# Patient Record
Sex: MALE | URBAN - METROPOLITAN AREA
[De-identification: names, ages, dates, MRNs, and addresses within clinical notes are randomized per-mention and may not be internally consistent; named-entity substitution may affect disease eponyms.]

---

## 2022-08-05 ENCOUNTER — APPOINTMENT (RX ONLY)
Dept: URBAN - METROPOLITAN AREA CLINIC 77 | Facility: CLINIC | Age: 27
Setting detail: DERMATOLOGY
End: 2022-08-05

## 2022-08-05 DIAGNOSIS — L98.8 OTHER SPECIFIED DISORDERS OF THE SKIN AND SUBCUTANEOUS TISSUE: ICD-10-CM

## 2022-08-05 DIAGNOSIS — L81.4 OTHER MELANIN HYPERPIGMENTATION: ICD-10-CM

## 2022-08-05 DIAGNOSIS — L85.3 XEROSIS CUTIS: ICD-10-CM

## 2022-08-05 PROCEDURE — ? PRESCRIPTION

## 2022-08-05 PROCEDURE — ? TREATMENT REGIMEN

## 2022-08-05 PROCEDURE — ? COUNSELING

## 2022-08-05 PROCEDURE — 99203 OFFICE O/P NEW LOW 30 MIN: CPT

## 2022-08-05 PROCEDURE — ? BOTOX (U OR CC)

## 2022-08-05 RX ORDER — PHARMACY COMPOUNDING ACCESSORY
EACH MISCELLANEOUS
Qty: 50 | Refills: 3 | Status: ERX | COMMUNITY
Start: 2022-08-05

## 2022-08-05 RX ADMIN — Medication: at 00:00

## 2022-08-05 ASSESSMENT — LOCATION SIMPLE DESCRIPTION DERM
LOCATION SIMPLE: LEFT TEMPLE
LOCATION SIMPLE: GLABELLA
LOCATION SIMPLE: LEFT FOREHEAD
LOCATION SIMPLE: RIGHT FOREHEAD
LOCATION SIMPLE: RIGHT TEMPLE
LOCATION SIMPLE: LEFT ELBOW
LOCATION SIMPLE: RIGHT ELBOW
LOCATION SIMPLE: INFERIOR FOREHEAD

## 2022-08-05 ASSESSMENT — LOCATION DETAILED DESCRIPTION DERM
LOCATION DETAILED: RIGHT INFERIOR FOREHEAD
LOCATION DETAILED: LEFT INFERIOR FOREHEAD
LOCATION DETAILED: LEFT INFERIOR TEMPLE
LOCATION DETAILED: LEFT ELBOW
LOCATION DETAILED: RIGHT MID TEMPLE
LOCATION DETAILED: INFERIOR MID FOREHEAD
LOCATION DETAILED: RIGHT FOREHEAD
LOCATION DETAILED: GLABELLA
LOCATION DETAILED: LEFT MEDIAL FOREHEAD
LOCATION DETAILED: RIGHT ELBOW

## 2022-08-05 ASSESSMENT — LOCATION ZONE DERM
LOCATION ZONE: ARM
LOCATION ZONE: FACE

## 2022-08-05 NOTE — PROCEDURE: BOTOX (U OR CC)
[facility] Units: 0
Including Pricing Information In The Note: No
Consent: Written consent obtained. Risks include but not limited to lid/brow ptosis, bruising, swelling, diplopia, temporary effect, incomplete chemical denervation.
Document As Units Or Cc?: units
Dilution (U/0.1 Cc): 4
Post-Care Instructions: Patient instructed to not lie down for 4 hours and limit physical activity for 24 hours.
Price (Use Numbers Only, No Special Characters Or $): 955
Detail Level: Detailed
Quantity Per Injection Site (Units Or Cc): 4 U

## 2022-08-05 NOTE — PROCEDURE: TREATMENT REGIMEN
Plan: Location: forehead, glabella and bilateral temples and bunny lines\\nTreatment: 45 units of Botox charge 40units of Botox \\n\\nPt is here for Botox \\nPt mentions he would like to discuss about his underbags of his eyes \\nToday I will administer 45units of Botox and charge for 40 units of Botox at $14 a unit\\nDiscussed with the patient I can administer filler if he ever decides to proceed and I referred him to Dr Vasquez to discuss about blepharoplasty treatment \\nDiscussed with the patient we will follow up in 2 weeks on the nurse schedule \\nAlso I will prescribe HQ 4% Tretinoin 0.025% compound cream QHS to help with the pigmentation under his eyes\\n\\n\\nF/u 4-6months
Detail Level: Zone
Plan: Location: face\\nPrescribed: HQ 4% Tretinoin 0.025% compound cream QHS\\n\\nPt has hyperpigmentation on face today.\\nDiscussed with pt that this pigmentation is due to sun damage to the skin, advised patient to use SPF daily.\\nDiscussed with pt that we will start patient on HQ 4% Tretinoin 0.025% to use nightly.\\nDiscussed with pt to apply a pea size amount to face, pushing into pores. \\nDiscussed with pt to avoid areas around eyes, nasal crease, and around the lips since skin is thin and may get irritated easily.\\n\\nDiscussed with pt that it will come in a box that says keep refrigerated.\\nDiscussed with pt that they do not need to keep it refrigerated, but have to keep it in a dark place since sunlight may oxidize it.\\nDiscussed with pt that if skin becomes irritated while using cream, then pt can change frequency to every other night, or every other other night, etc.\\nDiscussed with pt that results are not immediate and may take up to a month to notice change.\\nAdvised pt to apply a moisturizing cream such as Cerave afterwards to help reestablish a healthy skin barrier.\\nF/u as needed.

## 2022-08-17 ENCOUNTER — APPOINTMENT (RX ONLY)
Dept: URBAN - METROPOLITAN AREA CLINIC 77 | Facility: CLINIC | Age: 27
Setting detail: DERMATOLOGY
End: 2022-08-17

## 2022-08-17 DIAGNOSIS — L98.8 OTHER SPECIFIED DISORDERS OF THE SKIN AND SUBCUTANEOUS TISSUE: ICD-10-CM

## 2022-08-17 PROCEDURE — ? TREATMENT REGIMEN

## 2022-08-17 PROCEDURE — ? BOTOX (U OR CC)

## 2022-08-17 PROCEDURE — ? COUNSELING

## 2022-08-17 ASSESSMENT — LOCATION SIMPLE DESCRIPTION DERM
LOCATION SIMPLE: INFERIOR FOREHEAD
LOCATION SIMPLE: LEFT FOREHEAD
LOCATION SIMPLE: RIGHT TEMPLE
LOCATION SIMPLE: GLABELLA
LOCATION SIMPLE: LEFT TEMPLE
LOCATION SIMPLE: RIGHT FOREHEAD

## 2022-08-17 ASSESSMENT — LOCATION DETAILED DESCRIPTION DERM
LOCATION DETAILED: RIGHT MID TEMPLE
LOCATION DETAILED: LEFT INFERIOR FOREHEAD
LOCATION DETAILED: LEFT INFERIOR TEMPLE
LOCATION DETAILED: INFERIOR MID FOREHEAD
LOCATION DETAILED: RIGHT FOREHEAD
LOCATION DETAILED: RIGHT INFERIOR FOREHEAD
LOCATION DETAILED: LEFT MEDIAL FOREHEAD
LOCATION DETAILED: GLABELLA

## 2022-08-17 ASSESSMENT — LOCATION ZONE DERM: LOCATION ZONE: FACE

## 2022-08-17 NOTE — PROCEDURE: BOTOX (U OR CC)
[facility] Units: 0
Including Pricing Information In The Note: No
Consent: Written consent obtained. Risks include but not limited to lid/brow ptosis, bruising, swelling, diplopia, temporary effect, incomplete chemical denervation.
Document As Units Or Cc?: units
Dilution (U/0.1 Cc): 4
Post-Care Instructions: Patient instructed to not lie down for 4 hours and limit physical activity for 24 hours.
Detail Level: Detailed
Quantity Per Injection Site (Units Or Cc): 4 U

## 2022-08-17 NOTE — PROCEDURE: MIPS QUALITY
Quality 226: Preventive Care And Screening: Tobacco Use: Screening And Cessation Intervention: Patient screened for tobacco use and is an ex/non-smoker
Detail Level: Detailed
Quality 130: Documentation Of Current Medications In The Medical Record: Current Medications Documented
Quality 110: Preventive Care And Screening: Influenza Immunization: Influenza Immunization not Administered for Documented Reasons.
Additional Notes: Patient didn’t receive injection

## 2022-08-17 NOTE — PROCEDURE: TREATMENT REGIMEN
Plan: Location: forehead, glabella and bilateral temples and bunny lines\\nTreatment: 45 units of Botox charge 40units of Botox \\n\\n08/17/2022\\n\\n\\nPatient is here for a follow up on Botox \\nThere is still some wrinkling at todays visit, will administer 4 units free of charge per Dr. Reyna.\\n\\nPatient is to follow up as needed \\n——————————-\\nPt is here for Botox \\nPt mentions he would like to discuss about his underbags of his eyes \\nToday I will administer 45units of Botox and charge for 40 units of Botox at $14 a unit\\nDiscussed with the patient I can administer filler if he ever decides to proceed and I referred him to Dr Vasquez to discuss about blepharoplasty treatment \\nDiscussed with the patient we will follow up in 2 weeks on the nurse schedule \\nAlso I will prescribe HQ 4% Tretinoin 0.025% compound cream QHS to help with the pigmentation under his eyes\\n\\n\\nF/u 4-6months
Detail Level: Zone

## 2023-01-06 ENCOUNTER — APPOINTMENT (RX ONLY)
Dept: URBAN - METROPOLITAN AREA CLINIC 77 | Facility: CLINIC | Age: 28
Setting detail: DERMATOLOGY
End: 2023-01-06

## 2023-01-06 DIAGNOSIS — L98.8 OTHER SPECIFIED DISORDERS OF THE SKIN AND SUBCUTANEOUS TISSUE: ICD-10-CM

## 2023-01-06 PROCEDURE — ? COUNSELING

## 2023-01-06 PROCEDURE — ? TREATMENT REGIMEN

## 2023-01-06 PROCEDURE — ? BOTOX (U OR CC)

## 2023-01-06 ASSESSMENT — LOCATION ZONE DERM: LOCATION ZONE: FACE

## 2023-01-06 ASSESSMENT — LOCATION DETAILED DESCRIPTION DERM
LOCATION DETAILED: GLABELLA
LOCATION DETAILED: LEFT INFERIOR TEMPLE
LOCATION DETAILED: RIGHT MID TEMPLE
LOCATION DETAILED: RIGHT INFERIOR FOREHEAD
LOCATION DETAILED: INFERIOR MID FOREHEAD
LOCATION DETAILED: LEFT INFERIOR FOREHEAD
LOCATION DETAILED: LEFT MEDIAL FOREHEAD
LOCATION DETAILED: RIGHT FOREHEAD

## 2023-01-06 ASSESSMENT — LOCATION SIMPLE DESCRIPTION DERM
LOCATION SIMPLE: RIGHT TEMPLE
LOCATION SIMPLE: RIGHT FOREHEAD
LOCATION SIMPLE: LEFT TEMPLE
LOCATION SIMPLE: LEFT FOREHEAD
LOCATION SIMPLE: GLABELLA
LOCATION SIMPLE: INFERIOR FOREHEAD

## 2023-01-06 NOTE — PROCEDURE: BOTOX (U OR CC)
[facility] Units: 0
Including Pricing Information In The Note: No
Consent: Written consent obtained. Risks include but not limited to lid/brow ptosis, bruising, swelling, diplopia, temporary effect, incomplete chemical denervation.
Document As Units Or Cc?: units
Dilution (U/0.1 Cc): 4
Post-Care Instructions: Patient instructed to not lie down for 4 hours and limit physical activity for 24 hours.
Price (Use Numbers Only, No Special Characters Or $): 745
Detail Level: Detailed
Quantity Per Injection Site (Units Or Cc): 4 U

## 2023-01-06 NOTE — PROCEDURE: TREATMENT REGIMEN
Plan: Location: forehead, glabella and bilateral temples and bunny lines\\nPrevious Treatment: 45 units of Botox charge 40units of Botox(8/5/2022)\\nPrevious treatment: 4 units of Botox(8/17/2022)\\nTodays treatment: 50 units of Botox charge 45units of Botox \\n\\n1/6/2022\\nPt is here for Botox \\nPt had 4units administer of Botox in 8/17/2022 and had recently came in 8/5/2022 and 45units of Botox was administer \\nToday I will administer 50units of Botox and charge for 45 units of Botox at $14 a unit\\nDiscussed with the patient I can administer filler if he ever decides to proceed and I referred him to Dr Vasquez to discuss about blepharoplasty treatment \\n\\n\\nF/u 4-6months
Detail Level: Zone

## 2023-06-09 ENCOUNTER — APPOINTMENT (RX ONLY)
Dept: URBAN - METROPOLITAN AREA CLINIC 77 | Facility: CLINIC | Age: 28
Setting detail: DERMATOLOGY
End: 2023-06-09

## 2023-06-09 DIAGNOSIS — L98.8 OTHER SPECIFIED DISORDERS OF THE SKIN AND SUBCUTANEOUS TISSUE: ICD-10-CM

## 2023-06-09 DIAGNOSIS — L71.8 OTHER ROSACEA: ICD-10-CM

## 2023-06-09 PROCEDURE — ? BOTOX (U OR CC)

## 2023-06-09 PROCEDURE — ? PRESCRIPTION

## 2023-06-09 PROCEDURE — ? COUNSELING

## 2023-06-09 PROCEDURE — 99213 OFFICE O/P EST LOW 20 MIN: CPT

## 2023-06-09 PROCEDURE — ? TREATMENT REGIMEN

## 2023-06-09 RX ORDER — OXYMETAZOLINE HYDROCHLORIDE 1 G/100G
CREAM TOPICAL
Qty: 30 | Refills: 3 | Status: ERX | COMMUNITY
Start: 2023-06-09

## 2023-06-09 RX ADMIN — OXYMETAZOLINE HYDROCHLORIDE: 1 CREAM TOPICAL at 00:00

## 2023-06-09 ASSESSMENT — LOCATION DETAILED DESCRIPTION DERM
LOCATION DETAILED: LEFT INFERIOR FOREHEAD
LOCATION DETAILED: RIGHT FOREHEAD
LOCATION DETAILED: RIGHT MID TEMPLE
LOCATION DETAILED: RIGHT INFERIOR FOREHEAD
LOCATION DETAILED: GLABELLA
LOCATION DETAILED: LEFT MEDIAL FOREHEAD
LOCATION DETAILED: INFERIOR MID FOREHEAD
LOCATION DETAILED: LEFT INFERIOR TEMPLE

## 2023-06-09 ASSESSMENT — LOCATION SIMPLE DESCRIPTION DERM
LOCATION SIMPLE: LEFT TEMPLE
LOCATION SIMPLE: GLABELLA
LOCATION SIMPLE: LEFT FOREHEAD
LOCATION SIMPLE: RIGHT FOREHEAD
LOCATION SIMPLE: RIGHT TEMPLE
LOCATION SIMPLE: INFERIOR FOREHEAD

## 2023-06-09 ASSESSMENT — LOCATION ZONE DERM: LOCATION ZONE: FACE

## 2023-06-09 NOTE — PROCEDURE: BOTOX (U OR CC)
[facility] Units: 0
Including Pricing Information In The Note: No
Consent: Written consent obtained. Risks include but not limited to lid/brow ptosis, bruising, swelling, diplopia, temporary effect, incomplete chemical denervation.
Document As Units Or Cc?: units
Dilution (U/0.1 Cc): 4
Post-Care Instructions: Patient instructed to not lie down for 4 hours and limit physical activity for 24 hours.
Price (Use Numbers Only, No Special Characters Or $): 567
Detail Level: Detailed
Quantity Per Injection Site (Units Or Cc): 4 U

## 2023-06-09 NOTE — PROCEDURE: TREATMENT REGIMEN
Plan: Location: forehead, glabella and bilateral temples and bunny line\\nTodays treatment: 50 units of Botox charge 45units of Botox \\n\\n06/09/2023\\n\\nWill administer 50 units of Botox at today’s visit, however I will only be charging for 45 units at 14.00 dollars a unit
Detail Level: Zone
Plan: Location: face\\nPrescribe: Doryx po qd x 30 days\\nPlexion sulfa Cleanser top (10-30 sec) qd x 30 days\\nSoolantra 1% cream top qhs x 30 days\\nRhofade cream qd x 30 days\\n\\nPt presents with erythema. \\nDiscussed with the patient that it is an immune mediated condition that irritates the blood vessels and oil glands\\nEducated patient on trigger factors such as stress, spicy foods, alcohol, sunlight, etc.\\nRecommend using sunscreen with zinc oxide to help prevent trigger factors from activating\\n\\nWill prescribe Soolantra cream to use nightly to help eliminate mites that are associated with rosacea.\\nDiscussed with pt that we will also prescribe pt Rhofade cream to use in the morning to help reduce redness.\\Tammie addition to this treatment regimen, will prescribe pt Plexion sulfa Cleanser (10-30 sec) to further help calm down inflammation.\\n\\n(DORYX 50mg)\\nWE HAD A LONG DISCUSSION ABOUT THE BEST WAY TO USE DORYX---PT IS GOING TO START BY TAKING 2 X 50MG DAILY AND IF AFTER 4 DAYS PT HAS NOT SEEN MUCH IMPROVEMENT, WILL TITRATE UP---WE WILL FIND THE DOSING SCHEDULE THAT WORKS BEST FOR PT.\\nF/u as needed.

## 2023-11-10 ENCOUNTER — APPOINTMENT (RX ONLY)
Dept: URBAN - METROPOLITAN AREA CLINIC 77 | Facility: CLINIC | Age: 28
Setting detail: DERMATOLOGY
End: 2023-11-10

## 2023-11-10 DIAGNOSIS — L98.8 OTHER SPECIFIED DISORDERS OF THE SKIN AND SUBCUTANEOUS TISSUE: ICD-10-CM

## 2023-11-10 PROCEDURE — ? TREATMENT REGIMEN

## 2023-11-10 PROCEDURE — ? BOTOX (U OR CC)

## 2023-11-10 PROCEDURE — ? COUNSELING

## 2023-11-10 ASSESSMENT — LOCATION DETAILED DESCRIPTION DERM
LOCATION DETAILED: LEFT INFERIOR TEMPLE
LOCATION DETAILED: GLABELLA
LOCATION DETAILED: LEFT MEDIAL FOREHEAD
LOCATION DETAILED: RIGHT INFERIOR FOREHEAD
LOCATION DETAILED: LEFT INFERIOR FOREHEAD
LOCATION DETAILED: RIGHT MID TEMPLE
LOCATION DETAILED: RIGHT FOREHEAD
LOCATION DETAILED: INFERIOR MID FOREHEAD

## 2023-11-10 ASSESSMENT — LOCATION ZONE DERM: LOCATION ZONE: FACE

## 2023-11-10 ASSESSMENT — LOCATION SIMPLE DESCRIPTION DERM
LOCATION SIMPLE: RIGHT TEMPLE
LOCATION SIMPLE: INFERIOR FOREHEAD
LOCATION SIMPLE: RIGHT FOREHEAD
LOCATION SIMPLE: LEFT FOREHEAD
LOCATION SIMPLE: GLABELLA
LOCATION SIMPLE: LEFT TEMPLE

## 2023-11-10 NOTE — PROCEDURE: BOTOX (U OR CC)
[facility] Units: 0
Including Pricing Information In The Note: No
Consent: Written consent obtained. Risks include but not limited to lid/brow ptosis, bruising, swelling, diplopia, temporary effect, incomplete chemical denervation.
Document As Units Or Cc?: units
Dilution (U/0.1 Cc): 4
Post-Care Instructions: Patient instructed to not lie down for 4 hours and limit physical activity for 24 hours.
Price (Use Numbers Only, No Special Characters Or $): 542
Detail Level: Detailed
Quantity Per Injection Site (Units Or Cc): 4 U

## 2023-11-10 NOTE — PROCEDURE: TREATMENT REGIMEN
Plan: Location: forehead, glabella and bilateral temples and bunny line\\nTodays treatment: 50 units of Botox charge 45units of Botox \\n\\n11/10/2023\\n\\nPt is here for Botox \\nPt mentions he wants a lip flip \\nWill administer 52units of Botox at today’s visit, however I will only be charging for 48units at 15.00 dollars a unit
Detail Level: Zone

## 2024-04-24 ENCOUNTER — APPOINTMENT (RX ONLY)
Dept: URBAN - METROPOLITAN AREA CLINIC 77 | Facility: CLINIC | Age: 29
Setting detail: DERMATOLOGY
End: 2024-04-24

## 2024-04-24 DIAGNOSIS — L98.8 OTHER SPECIFIED DISORDERS OF THE SKIN AND SUBCUTANEOUS TISSUE: ICD-10-CM

## 2024-04-24 DIAGNOSIS — L71.8 OTHER ROSACEA: ICD-10-CM

## 2024-04-24 PROCEDURE — 99213 OFFICE O/P EST LOW 20 MIN: CPT

## 2024-04-24 PROCEDURE — ? BOTOX (U OR CC)

## 2024-04-24 PROCEDURE — ? COUNSELING

## 2024-04-24 PROCEDURE — ? PRESCRIPTION

## 2024-04-24 PROCEDURE — ? TREATMENT REGIMEN

## 2024-04-24 RX ORDER — OXYMETAZOLINE HYDROCHLORIDE 1 G/100G
CREAM TOPICAL
Qty: 30 | Refills: 3 | Status: ERX

## 2024-04-24 RX ORDER — DOXYCYCLINE HYCLATE 60 MG/1
TABLET, DELAYED RELEASE ORAL
Qty: 120 | Refills: 6 | Status: ERX | COMMUNITY
Start: 2024-04-24

## 2024-04-24 RX ADMIN — DOXYCYCLINE HYCLATE: 60 TABLET, DELAYED RELEASE ORAL at 00:00

## 2024-04-24 ASSESSMENT — LOCATION DETAILED DESCRIPTION DERM
LOCATION DETAILED: INFERIOR MID FOREHEAD
LOCATION DETAILED: LEFT INFERIOR TEMPLE
LOCATION DETAILED: RIGHT INFERIOR FOREHEAD
LOCATION DETAILED: GLABELLA
LOCATION DETAILED: RIGHT FOREHEAD
LOCATION DETAILED: LEFT INFERIOR FOREHEAD
LOCATION DETAILED: RIGHT MID TEMPLE
LOCATION DETAILED: LEFT MEDIAL FOREHEAD

## 2024-04-24 ASSESSMENT — LOCATION SIMPLE DESCRIPTION DERM
LOCATION SIMPLE: RIGHT TEMPLE
LOCATION SIMPLE: LEFT TEMPLE
LOCATION SIMPLE: RIGHT FOREHEAD
LOCATION SIMPLE: LEFT FOREHEAD
LOCATION SIMPLE: INFERIOR FOREHEAD
LOCATION SIMPLE: GLABELLA

## 2024-04-24 ASSESSMENT — LOCATION ZONE DERM: LOCATION ZONE: FACE

## 2024-04-24 NOTE — PROCEDURE: BOTOX (U OR CC)
[facility] Units: 0
Including Pricing Information In The Note: No
Consent: Written consent obtained. Risks include but not limited to lid/brow ptosis, bruising, swelling, diplopia, temporary effect, incomplete chemical denervation.
Document As Units Or Cc?: units
Dilution (U/0.1 Cc): 4
Post-Care Instructions: Patient instructed to not lie down for 4 hours and limit physical activity for 24 hours.
Price (Use Numbers Only, No Special Characters Or $): 008
Detail Level: Detailed
Quantity Per Injection Site (Units Or Cc): 4 U

## 2024-04-24 NOTE — PROCEDURE: TREATMENT REGIMEN
Detail Level: Zone
Plan: Location: face\\nPrescribe: Rhofade cream qd x 30 days\\n\\nPt presents with erythema. \\nDiscussed with the patient that it is an immune mediated condition that irritates the blood vessels and oil glands\\nEducated patient on trigger factors such as stress, spicy foods, alcohol, sunlight, etc.\\nRecommend using sunscreen with zinc oxide to help prevent trigger factors from activating\\n\\nDiscussed with pt that we will also prescribe pt Rhofade cream to use in the morning to help reduce redness.\\Tammie addition to this treatment regimen, will prescribe pt Doryx 60mg tablet once daily as needed to further help calm down inflammation.\\n\\nF/u as needed.
Plan: Location: forehead, glabella and bilateral temples and bunny line\\nTodays treatment: 50 units of Botox charge 48units of Botox \\n\\n04/24/2024\\n\\nPt is here for Botox follow up. \\nWill administer 52units of Botox at today’s visit, however I will only be charging for 48units at 15.00 dollars a unit

## 2024-08-27 ENCOUNTER — APPOINTMENT (RX ONLY)
Dept: URBAN - METROPOLITAN AREA CLINIC 77 | Facility: CLINIC | Age: 29
Setting detail: DERMATOLOGY
End: 2024-08-27

## 2024-08-27 DIAGNOSIS — L98.8 OTHER SPECIFIED DISORDERS OF THE SKIN AND SUBCUTANEOUS TISSUE: ICD-10-CM

## 2024-08-27 DIAGNOSIS — L71.8 OTHER ROSACEA: ICD-10-CM

## 2024-08-27 PROCEDURE — ? TREATMENT REGIMEN

## 2024-08-27 PROCEDURE — ? COUNSELING

## 2024-08-27 PROCEDURE — ? PRESCRIPTION

## 2024-08-27 PROCEDURE — 99213 OFFICE O/P EST LOW 20 MIN: CPT

## 2024-08-27 PROCEDURE — ? BOTOX (U OR CC)

## 2024-08-27 RX ORDER — PHARMACY COMPOUNDING ACCESSORY
EACH MISCELLANEOUS
Qty: 30 | Refills: 0 | Status: ERX | COMMUNITY
Start: 2024-08-27

## 2024-08-27 RX ADMIN — Medication: at 00:00

## 2024-08-27 ASSESSMENT — LOCATION SIMPLE DESCRIPTION DERM
LOCATION SIMPLE: RIGHT TEMPLE
LOCATION SIMPLE: GLABELLA
LOCATION SIMPLE: INFERIOR FOREHEAD
LOCATION SIMPLE: LEFT FOREHEAD
LOCATION SIMPLE: RIGHT FOREHEAD
LOCATION SIMPLE: LEFT TEMPLE

## 2024-08-27 ASSESSMENT — LOCATION DETAILED DESCRIPTION DERM
LOCATION DETAILED: LEFT INFERIOR TEMPLE
LOCATION DETAILED: INFERIOR MID FOREHEAD
LOCATION DETAILED: RIGHT INFERIOR FOREHEAD
LOCATION DETAILED: RIGHT FOREHEAD
LOCATION DETAILED: RIGHT MID TEMPLE
LOCATION DETAILED: LEFT INFERIOR FOREHEAD
LOCATION DETAILED: GLABELLA
LOCATION DETAILED: LEFT MEDIAL FOREHEAD

## 2024-08-27 ASSESSMENT — LOCATION ZONE DERM: LOCATION ZONE: FACE

## 2024-08-27 NOTE — PROCEDURE: BOTOX (U OR CC)
[facility] Units: 0
Including Pricing Information In The Note: No
Consent: Written consent obtained. Risks include but not limited to lid/brow ptosis, bruising, swelling, diplopia, temporary effect, incomplete chemical denervation.
Document As Units Or Cc?: units
Dilution (U/0.1 Cc): 4
Post-Care Instructions: Patient instructed to not lie down for 4 hours and limit physical activity for 24 hours.
Price (Use Numbers Only, No Special Characters Or $): 799
Detail Level: Detailed
Quantity Per Injection Site (Units Or Cc): 4 U

## 2024-08-27 NOTE — PROCEDURE: TREATMENT REGIMEN
Plan: Location: forehead, glabella and bilateral temples and bunny line\\nPrevious treatment: 50 units of Botox charge 48units of Botox (4/24/24) \\nTodays treatment: 50 units of Botox charge 48units of Botox \\n\\n08/27/2024\\n\\nPt is here for Botox follow up. \\n\\nWill administer 45 units of Botox at today’s visit, 15 dollars a unit
Detail Level: Zone
Plan: Location: face\\nPreviously Prescribed: Rhofade cream qd x 30 days\\nPrescribed: Azelaic acid 15%, metronidazole 1%, ivermectin 1%CR\\n\\nPt is here to follow up on rosacea \\nDiscussed with the patient that it is an immune mediated condition that irritates the blood vessels and oil glands\\nEducated patient on trigger factors such as stress, spicy foods, alcohol, sunlight, etc.\\nRecommend using sunscreen with zinc oxide to help prevent trigger factors from activating\\n\\nDiscussed with pt that he will continue Rhofade cream to use in the morning to help reduce redness.\\Tammie addition to this treatment regimen, will prescribe pt Azelaic acid 15%, metronidazole 1%, ivermectin 1%CR on face nightly as needed to help with inflammation \\n\\nF/u as needed.

## 2025-02-06 ENCOUNTER — APPOINTMENT (OUTPATIENT)
Dept: URBAN - METROPOLITAN AREA CLINIC 45 | Facility: CLINIC | Age: 30
Setting detail: DERMATOLOGY
End: 2025-02-06

## 2025-02-06 DIAGNOSIS — L71.8 OTHER ROSACEA: ICD-10-CM

## 2025-02-06 DIAGNOSIS — L98.8 OTHER SPECIFIED DISORDERS OF THE SKIN AND SUBCUTANEOUS TISSUE: ICD-10-CM

## 2025-02-06 PROCEDURE — ? COUNSELING

## 2025-02-06 PROCEDURE — ? BOTOX (U OR CC)

## 2025-02-06 PROCEDURE — ? TREATMENT REGIMEN

## 2025-02-06 PROCEDURE — 99213 OFFICE O/P EST LOW 20 MIN: CPT

## 2025-02-06 PROCEDURE — ? PRESCRIPTION

## 2025-02-06 RX ORDER — PHARMACY COMPOUNDING ACCESSORY
EACH MISCELLANEOUS
Qty: 30 | Refills: 10 | Status: ERX | COMMUNITY
Start: 2025-02-06

## 2025-02-06 RX ADMIN — Medication: at 00:00

## 2025-02-06 ASSESSMENT — LOCATION DETAILED DESCRIPTION DERM
LOCATION DETAILED: LEFT MEDIAL FOREHEAD
LOCATION DETAILED: LEFT INFERIOR TEMPLE
LOCATION DETAILED: GLABELLA
LOCATION DETAILED: RIGHT MID TEMPLE
LOCATION DETAILED: RIGHT INFERIOR FOREHEAD
LOCATION DETAILED: INFERIOR MID FOREHEAD
LOCATION DETAILED: RIGHT FOREHEAD
LOCATION DETAILED: LEFT INFERIOR FOREHEAD

## 2025-02-06 ASSESSMENT — LOCATION SIMPLE DESCRIPTION DERM
LOCATION SIMPLE: INFERIOR FOREHEAD
LOCATION SIMPLE: RIGHT FOREHEAD
LOCATION SIMPLE: LEFT FOREHEAD
LOCATION SIMPLE: LEFT TEMPLE
LOCATION SIMPLE: GLABELLA
LOCATION SIMPLE: RIGHT TEMPLE

## 2025-02-06 ASSESSMENT — LOCATION ZONE DERM: LOCATION ZONE: FACE

## 2025-02-06 NOTE — PROCEDURE: BOTOX (U OR CC)
[facility] Units: 0
Including Pricing Information In The Note: No
Consent: Written consent obtained. Risks include but not limited to lid/brow ptosis, bruising, swelling, diplopia, temporary effect, incomplete chemical denervation.
Document As Units Or Cc?: units
Dilution (U/0.1 Cc): 4
Post-Care Instructions: Patient instructed to not lie down for 4 hours and limit physical activity for 24 hours.
Price (Use Numbers Only, No Special Characters Or $): 557
Detail Level: Detailed
Quantity Per Injection Site (Units Or Cc): 4 U

## 2025-02-06 NOTE — PROCEDURE: TREATMENT REGIMEN
Plan: Location: forehead, glabella and bilateral temples and bunny line\\nPrevious treatment: 50 units of Botox charge 48units of Botox (4/24/24) \\nTodays treatment: 45 units of Botox charge 43units \\n\\n2/6/25\\n\\nPt is here for Botox follow up. \\n\\nWill administer 45 units of Botox at today’s visit, 15 dollars a unit
Detail Level: Zone
Plan: Location: face\\nPreviously Prescribed: Rhofade cream qd x 30 days\\nPrescribed: Azelaic acid 15%, metronidazole 1%, ivermectin 1%CR\\n\\n2/6/25\\nPt is here to follow up on rosacea \\nPt states that the medication has been effective in treating his rosacea \\nDiscussed with the patient that it is an immune mediated condition that irritates the blood vessels and oil glands\\nEducated patient on trigger factors such as stress, spicy foods, alcohol, sunlight, etc.\\nRecommend using sunscreen with zinc oxide to help prevent trigger factors from activating\\n\\nDiscussed with pt that he will continue Rhofade cream to use in the morning to help reduce redness.\\Tammie addition to this treatment regimen, will prescribe pt Azelaic acid 15%, metronidazole 1%, ivermectin 1%CR on face nightly as needed to help with inflammation \\n\\nF/u as needed.